# Patient Record
Sex: FEMALE | Race: WHITE | NOT HISPANIC OR LATINO | Employment: UNEMPLOYED | URBAN - NONMETROPOLITAN AREA
[De-identification: names, ages, dates, MRNs, and addresses within clinical notes are randomized per-mention and may not be internally consistent; named-entity substitution may affect disease eponyms.]

---

## 2017-02-20 ENCOUNTER — OFFICE VISIT (OUTPATIENT)
Dept: PSYCHIATRY | Facility: CLINIC | Age: 47
End: 2017-02-20

## 2017-02-20 DIAGNOSIS — F33.1 MAJOR DEPRESSIVE DISORDER, RECURRENT EPISODE, MODERATE (HCC): ICD-10-CM

## 2017-02-20 PROBLEM — F33.9 MAJOR DEPRESSIVE DISORDER, RECURRENT EPISODE (HCC): Status: ACTIVE | Noted: 2017-02-20

## 2017-02-20 PROCEDURE — 90791 PSYCH DIAGNOSTIC EVALUATION: CPT | Performed by: SOCIAL WORKER
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
## 2017-04-05 ENCOUNTER — OFFICE VISIT (OUTPATIENT)
Dept: PSYCHIATRY | Facility: CLINIC | Age: 47
End: 2017-04-05

## 2017-04-05 DIAGNOSIS — F33.0 MILD EPISODE OF RECURRENT MAJOR DEPRESSIVE DISORDER (HCC): ICD-10-CM

## 2017-04-05 PROCEDURE — 90837 PSYTX W PT 60 MINUTES: CPT | Performed by: SOCIAL WORKER

## 2017-04-05 NOTE — PROGRESS NOTES
PROGRESS NOTE  Data:  Mervat Woody was seen for the regularly scheduled individual psychotherapy session in the [facility] at 9 AM to 10 AM.  This is the second time meeting patient.  It has been over 2 months since patient's initial evaluation.  According to patient her primary care provider changed her depression medicine to Wellbutrin.  They believed the Zoloft was having a negative effect on her hard.  Patient continues to have health issues that are concerning.  No change in stressors.  Patient worries about her daughter who lives with her.  Her daughter has not been able to separate from mother without extreme anxiety.  Patient does have a new stressor in regard to her sister's health.  In fact her sister had to be taken to the hospital by ambulance last week..    (Scales based on 0 - 10 with 10 being the worst)  Depression: 4 Anxiety: 5   Distress: 3 Sleep: 1   Tasks Completed on Time: 0 Mood: 2   Number of Panic Attacks: 0 Appetite: 0       Mental Status Exam  Appearance:  clean and casually dressed, appropriate  Attitude toward clinician:  cooperative and agreeable   Speech:    Rate:  regular rate and rhythm   Volume:  normal  Motor:  no abnormal movements present  Mood:  tired  Affect:  mood congruent  Thought Processes:  linear, logical, and goal directed  Thought Content:  normal  Suicidal Thoughts:  absent  Homicidal Thoughts:  absent  Perceptual Disturbance: no perceptual disturbance  Attention and Concentration:  good  Insight and Judgement:  fair  Memory:  memory appears to be intact    Patient's Support Network Includes:  Family  Assessment/Plan  patient to return in 2 weeks for continued psychotherapy with focus being on learning strategies to assist her in addressing her anxiety levels specifically related to worrying.  Family physician will continue with medications.  Clinical Maneuvering/Intervention:  Processing the above with patient.  Validating patient's emotions and feelings  as well as her concerns.  Providing an environment for patient to explore and express those emotions and feelings free of judgment and/or criticism.  Providing support therapy.  Explored mindfulness techniques.  Gave patient CD to begin addressing the anxiety that she reports makes it difficult for her to find peace of mind.  Will process this with patient on her next appointment.  Patient is working on  herself from her daughter and daughter's significant other, both who live with patient.  She reports that separation anxiety is a significant problem for her daughter.  In fact her daughter is not been able to hold down employment because of the anxiety levels when she is away from her mother.  Diagnoses and all orders for this visit:    Mild episode of recurrent major depressive disorder      Return in about 2 weeks (around 4/19/2017) for Next scheduled follow up.

## 2017-04-19 ENCOUNTER — OFFICE VISIT (OUTPATIENT)
Dept: PSYCHIATRY | Facility: CLINIC | Age: 47
End: 2017-04-19

## 2017-04-19 DIAGNOSIS — F33.0 MILD EPISODE OF RECURRENT MAJOR DEPRESSIVE DISORDER (HCC): ICD-10-CM

## 2017-04-19 PROCEDURE — 90834 PSYTX W PT 45 MINUTES: CPT | Performed by: SOCIAL WORKER

## 2017-04-19 NOTE — PROGRESS NOTES
PROGRESS NOTE  Data:  Mervat Woody was seen for their regularly scheduled individual psychotherapy session [facility] at 3 PM to 3:45 PM.  Patient had just completed physical therapy prior to this appointment.  She reports that she is physically exhausted as a result of the physical therapist appointment.  Patient talked about plans to move out on her own as well as specific steps that she has taken to locate a piece of property or a trailer that she can move from place to place.  Patient distressed over the health of her sister.  She reports that her brother-in-law all did call to apologize for the argument/conflict that the to had when patient went to help out.  Patient more positive as to her daughter's future.  In fact she states that her daughter is maturing and seems to be happier and she has been in the past    (Scales based on 0 - 10 with 10 being the worst)  Depression: 3 Anxiety: 2   Distress: 3 Sleep: 0   Tasks Completed on Time: 1 Mood: 1   Number of Panic Attacks: 0 Appetite: 0   Patient's primary distress at this point in time is her physical health issues, as well as applying for disability benefits    Mental Status Exam  Appearance:  clean and casually dressed, appropriate  Attitude toward clinician:  cooperative and agreeable   Speech:    Rate:  regular rate and rhythm   Volume:  normal  Motor:  no abnormal movements present  Mood:  determined  Affect:  mood congruent  Thought Processes:  linear, logical, and goal directed  Thought Content:  normal  Suicidal Thoughts:  absent  Homicidal Thoughts:  absent  Perceptual Disturbance: no perceptual disturbance  Attention and Concentration:  fair  Insight and Judgement:  fair  Memory:  memory appears to be intact    Patient's Support Network Includes:  Daughter  Assessment/Plan  she will continue in 3 weeks for her next appointment.  The direction of psychotherapy will be on assisting patient in identifying areas in which she can  control/improved the quality of her life as well as her relationships with her sister.  Clinical Maneuvering/Intervention:  Processing the above with patient.  Validating her emotions and feelings as well as her concerns.  Providing environment for patient to explore and express his emotions and feelings free of judgment and criticism.  Processing and disc guessing codependency patterns in patient's relationships throughout her life as well as the importance of making herself a priority.    Diagnoses and all orders for this visit:    Mild episode of recurrent major depressive disorder      Return in about 3 weeks (around 5/10/2017) for Next scheduled follow up.